# Patient Record
Sex: FEMALE | Race: WHITE | NOT HISPANIC OR LATINO | Employment: FULL TIME | URBAN - METROPOLITAN AREA
[De-identification: names, ages, dates, MRNs, and addresses within clinical notes are randomized per-mention and may not be internally consistent; named-entity substitution may affect disease eponyms.]

---

## 2018-02-06 RX ORDER — METAXALONE 800 MG/1
800 TABLET ORAL 3 TIMES DAILY PRN
COMMUNITY
End: 2018-03-13

## 2018-02-06 RX ORDER — HYDROCODONE BITARTRATE AND ACETAMINOPHEN 10; 325 MG/1; MG/1
1 TABLET ORAL 4 TIMES DAILY PRN
COMMUNITY

## 2018-02-06 RX ORDER — FLUOXETINE HYDROCHLORIDE 20 MG/1
20 CAPSULE ORAL DAILY
COMMUNITY
End: 2018-07-31

## 2018-02-06 RX ORDER — IBUPROFEN 200 MG
200 TABLET ORAL EVERY 6 HOURS PRN
COMMUNITY
End: 2018-03-13

## 2018-02-06 RX ORDER — LISINOPRIL 5 MG/1
5 TABLET ORAL DAILY
COMMUNITY

## 2018-02-06 RX ORDER — ALBUTEROL SULFATE 90 UG/1
2 AEROSOL, METERED RESPIRATORY (INHALATION) EVERY 4 HOURS PRN
COMMUNITY
End: 2018-07-31

## 2018-02-06 RX ORDER — NAPROXEN 500 MG/1
250 TABLET ORAL 2 TIMES DAILY WITH MEALS
COMMUNITY
End: 2018-03-13

## 2018-02-06 RX ORDER — CYCLOBENZAPRINE HCL 10 MG
10 TABLET ORAL 3 TIMES DAILY PRN
COMMUNITY
End: 2018-03-13

## 2018-02-07 ENCOUNTER — DOCUMENTATION (OUTPATIENT)
Dept: NEUROSURGERY | Facility: CLINIC | Age: 53
End: 2018-02-07

## 2018-02-07 ENCOUNTER — OFFICE VISIT (OUTPATIENT)
Dept: NEUROSURGERY | Facility: CLINIC | Age: 53
End: 2018-02-07

## 2018-02-07 VITALS — TEMPERATURE: 97.3 F | HEIGHT: 61 IN | WEIGHT: 160 LBS | BODY MASS INDEX: 30.21 KG/M2

## 2018-02-07 DIAGNOSIS — M51.36 DEGENERATIVE DISC DISEASE, LUMBAR: ICD-10-CM

## 2018-02-07 DIAGNOSIS — M51.16 HERNIATION OF LUMBAR INTERVERTEBRAL DISC WITH RADICULOPATHY: Primary | ICD-10-CM

## 2018-02-07 DIAGNOSIS — M47.816 FACET ARTHRITIS OF LUMBAR REGION: ICD-10-CM

## 2018-02-07 PROCEDURE — 99243 OFF/OP CNSLTJ NEW/EST LOW 30: CPT | Performed by: NEUROLOGICAL SURGERY

## 2018-02-07 RX ORDER — GABAPENTIN 300 MG/1
300 CAPSULE ORAL 3 TIMES DAILY
Qty: 90 CAPSULE | Refills: 1 | OUTPATIENT
Start: 2018-02-07 | End: 2018-04-19 | Stop reason: SDUPTHER

## 2018-02-07 NOTE — PROGRESS NOTES
Patient: Doreen Canela  : 1965    Primary Care Provider: Taras Silver MD    Requesting Provider: As above      History    Chief Complaint: Back and right leg pain.    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  She has a 2-1/2 year history of intermittent low back pain.  5 weeks ago she awoke with a severe stabbing pain in her back that runs down the right leg into the right anterior shin.  She's been to the emergency room on 2 occasions.  She's been treated oral steroids and muscle relaxants.  She is using hydrocodone which modestly helps.  Occasionally she has some tingling in both legs.  She has no left leg pain.  She denies bowel or bladder dysfunction.  She is worse with walking, sitting, lying down.  Soaking in a tub can be helpful.  She's been working with Dr. High her chiropractor.    Review of Systems   Constitutional: Negative for activity change, appetite change, chills, diaphoresis, fatigue, fever and unexpected weight change.   HENT: Negative for congestion, dental problem, drooling, ear discharge, ear pain, facial swelling, hearing loss, mouth sores, nosebleeds, postnasal drip, rhinorrhea, sinus pressure, sneezing, sore throat, tinnitus, trouble swallowing and voice change.    Eyes: Negative for photophobia, pain, discharge, redness, itching and visual disturbance.   Respiratory: Negative for apnea, cough, choking, chest tightness, shortness of breath, wheezing and stridor.    Cardiovascular: Negative for chest pain, palpitations and leg swelling.   Gastrointestinal: Negative for abdominal distention, abdominal pain, anal bleeding, blood in stool, constipation, diarrhea, nausea, rectal pain and vomiting.   Endocrine: Negative for cold intolerance, heat intolerance, polydipsia, polyphagia and polyuria.   Genitourinary: Negative for decreased urine volume, difficulty urinating, dysuria, enuresis, flank pain, frequency, genital  "sores, hematuria and urgency.   Musculoskeletal: Positive for back pain. Negative for arthralgias, gait problem, joint swelling, myalgias, neck pain and neck stiffness.   Skin: Negative for color change, pallor, rash and wound.   Allergic/Immunologic: Negative for environmental allergies, food allergies and immunocompromised state.   Neurological: Negative for dizziness, tremors, seizures, syncope, facial asymmetry, speech difficulty, weakness, light-headedness, numbness and headaches.   Hematological: Negative for adenopathy. Does not bruise/bleed easily.   Psychiatric/Behavioral: Negative for agitation, behavioral problems, confusion, decreased concentration, dysphoric mood, hallucinations, self-injury, sleep disturbance and suicidal ideas. The patient is not nervous/anxious and is not hyperactive.        The patient's past medical history, past surgical history, family history, and social history have been reviewed at length in the electronic medical record.    Physical Exam:   Temp 97.3 °F (36.3 °C) (Temporal Artery )   Ht 154.9 cm (61\")  Wt 72.6 kg (160 lb)  BMI 30.23 kg/m2  CONSTITUTIONAL: Patient is well-nourished, pleasant and appears stated age.  CV: Heart regular rate and rhythm without murmur, rub, or gallop.  PULMONARY: Lungs are clear to ascultation.  MUSCULOSKELETAL:  The patient is miserable and prefers to lie on the examination table and only with difficulties able to sit up.  Straight leg raising is positive on the right at approximate 45°..  Ky's Sign is negative.  ROM in back is noted in all directions.  Tenderness in the back to palpation is not observed.  NEUROLOGICAL:  Orientation, memory, attention span, language function, and cognition have been examined and are intact.  Strength is intact in the lower extremities to direct testing.  Muscle tone is normal throughout.  Station and gait are normal.  Sensation is intact to light touch testing throughout.  Deep tendon reflexes are 1+ and " symmetrical.  Coordination is intact.      Medical Decision Making    Data Review:   MRI lumbar spine dated 2/2/18 demonstrates some degenerative disc disease and some disc bulging.  I believe that there is an extraforaminal disc protrusion on the right at L4-5 that compromises the L4 nerve root.    Diagnosis:   Right L4 radiculopathy secondary to extraforaminal disc protrusion.    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  I prescribed Neurontin.  She'll follow-up in my clinic in about 2 weeks.  If her symptoms persist then we'll need to consider extraforaminal discectomy on the right at L4-5.       Diagnosis Plan   1. Herniation of lumbar intervertebral disc with radiculopathy     2. Degenerative disc disease, lumbar     3. Facet arthritis of lumbar region         Scribed for Bayron Vega MD by Tracey Mann CMA on 02/07/2018 at 10:03 AM      I, Dr. Vega, personally performed the services described in the documentation, as scribed in my presence, and it is both accurate and complete.

## 2018-02-07 NOTE — PROGRESS NOTES
Pt called in regards to her Gabapentin Rx, Pharmacy hadn't received it.    -I went ahead and called in Rx since Tracey is in clinic at the moment.   BELLA Varghese.

## 2018-02-20 ENCOUNTER — OFFICE VISIT (OUTPATIENT)
Dept: NEUROSURGERY | Facility: CLINIC | Age: 53
End: 2018-02-20

## 2018-02-20 VITALS — WEIGHT: 162 LBS | TEMPERATURE: 96.9 F | HEIGHT: 61 IN | BODY MASS INDEX: 30.58 KG/M2

## 2018-02-20 DIAGNOSIS — M51.36 DEGENERATIVE DISC DISEASE, LUMBAR: ICD-10-CM

## 2018-02-20 DIAGNOSIS — M47.816 FACET ARTHRITIS OF LUMBAR REGION: ICD-10-CM

## 2018-02-20 DIAGNOSIS — M51.16 HERNIATION OF LUMBAR INTERVERTEBRAL DISC WITH RADICULOPATHY: Primary | ICD-10-CM

## 2018-02-20 PROCEDURE — 99213 OFFICE O/P EST LOW 20 MIN: CPT | Performed by: NEUROLOGICAL SURGERY

## 2018-02-20 NOTE — PROGRESS NOTES
Patient: Doreen Canela  : 1965    Primary Care Provider: Taras Silver MD    Requesting Provider: As above      History    Chief Complaint: Back and right leg pain.    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  She has a 2-1/2 year history of intermittent low back pain.  8 weeks ago she awoke with a severe stabbing pain in her back that runs down the right leg into the right anterior shin.  She's been to the emergency room on 2 occasions.  She's been treated oral steroids and muscle relaxants.  She is using hydrocodone which modestly helps.  Occasionally she has some tingling in both legs.  She has no left leg pain.  She denies bowel or bladder dysfunction.  She is worse with walking, sitting, lying down.  Soaking in a tub can be helpful.  She's worked with Dr. High her chiropractor.  She has attended physical therapy and overall feels somewhat better.  Traction worsened her symptoms and was abandoned.  She continues to take Neurontin and naproxen which are helpful.    Review of Systems   Constitutional: Negative for activity change, appetite change, chills, diaphoresis, fatigue, fever and unexpected weight change.   HENT: Negative for congestion, dental problem, drooling, ear discharge, ear pain, facial swelling, hearing loss, mouth sores, nosebleeds, postnasal drip, rhinorrhea, sinus pressure, sneezing, sore throat, tinnitus, trouble swallowing and voice change.    Eyes: Negative for photophobia, pain, discharge, redness, itching and visual disturbance.   Respiratory: Negative for apnea, cough, choking, chest tightness, shortness of breath, wheezing and stridor.    Cardiovascular: Negative for chest pain, palpitations and leg swelling.   Gastrointestinal: Negative for abdominal distention, abdominal pain, anal bleeding, blood in stool, constipation, diarrhea, nausea, rectal pain and vomiting.   Endocrine: Negative for cold intolerance,  "heat intolerance, polydipsia, polyphagia and polyuria.   Genitourinary: Negative for decreased urine volume, difficulty urinating, dysuria, enuresis, flank pain, frequency, genital sores, hematuria and urgency.   Musculoskeletal: Positive for back pain. Negative for arthralgias, gait problem, joint swelling, myalgias, neck pain and neck stiffness.   Skin: Negative for color change, pallor, rash and wound.   Allergic/Immunologic: Negative for environmental allergies, food allergies and immunocompromised state.   Neurological: Negative for dizziness, tremors, seizures, syncope, facial asymmetry, speech difficulty, weakness, light-headedness, numbness and headaches.   Hematological: Negative for adenopathy. Does not bruise/bleed easily.   Psychiatric/Behavioral: Negative for agitation, behavioral problems, confusion, decreased concentration, dysphoric mood, hallucinations, self-injury, sleep disturbance and suicidal ideas. The patient is not nervous/anxious and is not hyperactive.        The patient's past medical history, past surgical history, family history, and social history have been reviewed at length in the electronic medical record.    Physical Exam:   Temp 96.9 °F (36.1 °C) (Temporal Artery )   Ht 154.9 cm (61\")  Wt 73.5 kg (162 lb)  BMI 30.61 kg/m2  MUSCULOSKELETAL:  Straight leg raising is negative.  Ky's Sign is negative.  ROM in back is normal.  Tenderness in the back to palpation is not observed.  NEUROLOGICAL:  Strength is intact in the lower extremities to direct testing.  Muscle tone is normal throughout.  Station and gait are normal.  Sensation is intact to light touch testing throughout except in the right anterior shin where it is diminished.      Medical Decision Making    Data Review:   MRI lumbar spine dated 2/2/18 demonstrates some degenerative disc disease and some disc bulging.  I believe that there is an extraforaminal disc protrusion on the right at L4-5 that compromises the L4 " nerve root.    Diagnosis:   Right L4 radiculopathy secondary to extraforaminal disc protrusion.    Treatment Options:   Ms. Canela was made some improvement.  With difficulty she was able to work at 9 hour shift yesterday.  We will continue her therapy and medication.  She'll follow-up in my clinic in 3-4 weeks to check on her progress.  If her symptoms persist then we will need to consider either an epidural injection or surgical intervention.       Diagnosis Plan   1. Herniation of lumbar intervertebral disc with radiculopathy     2. Degenerative disc disease, lumbar     3. Facet arthritis of lumbar region         Scribed for Bayron Vega MD by Tracey Mann CMA on 02/20/2018 at 9:19 AM      I, Dr. Vega, personally performed the services described in the documentation, as scribed in my presence, and it is both accurate and complete.

## 2018-03-13 ENCOUNTER — OFFICE VISIT (OUTPATIENT)
Dept: NEUROSURGERY | Facility: CLINIC | Age: 53
End: 2018-03-13

## 2018-03-13 VITALS — WEIGHT: 158 LBS | HEIGHT: 61 IN | BODY MASS INDEX: 29.83 KG/M2 | TEMPERATURE: 97.1 F

## 2018-03-13 DIAGNOSIS — M51.36 DEGENERATIVE DISC DISEASE, LUMBAR: ICD-10-CM

## 2018-03-13 DIAGNOSIS — M47.816 FACET ARTHRITIS OF LUMBAR REGION: ICD-10-CM

## 2018-03-13 DIAGNOSIS — M51.16 HERNIATION OF LUMBAR INTERVERTEBRAL DISC WITH RADICULOPATHY: Primary | ICD-10-CM

## 2018-03-13 PROCEDURE — 99212 OFFICE O/P EST SF 10 MIN: CPT | Performed by: NEUROLOGICAL SURGERY

## 2018-03-13 NOTE — PROGRESS NOTES
Patient: Doreen Canela  : 1965    Primary Care Provider: Taras Silver MD    Requesting Provider: As above        History    Chief Complaint: Back and right leg pain.    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  She has a 2.5 year history of intermittent low back pain.  About 10-12 weeks ago she awoke with severe pain in her back running into the right anterior shin.  Studies demonstrated an extraforaminal disc protrusion on the right at L4-5 that was felt to be the source for her symptoms.  She has continued on her therapy as well as Neurontin and naproxen.  She has some tingling in her right shin.  She's continued to work.  She's increased her activity.  She has little pain at this point.  She continues to smoke heavily.  She has stopped her naproxen.    Review of Systems   Constitutional: Negative for activity change, appetite change, chills, diaphoresis, fatigue, fever and unexpected weight change.   HENT: Negative for congestion, dental problem, drooling, ear discharge, ear pain, facial swelling, hearing loss, mouth sores, nosebleeds, postnasal drip, rhinorrhea, sinus pressure, sneezing, sore throat, tinnitus, trouble swallowing and voice change.    Eyes: Negative for photophobia, pain, discharge, redness, itching and visual disturbance.   Respiratory: Negative for apnea, cough, choking, chest tightness, shortness of breath, wheezing and stridor.    Cardiovascular: Negative for chest pain, palpitations and leg swelling.   Gastrointestinal: Negative for abdominal distention, abdominal pain, anal bleeding, blood in stool, constipation, diarrhea, nausea, rectal pain and vomiting.   Endocrine: Negative for cold intolerance, heat intolerance, polydipsia, polyphagia and polyuria.   Genitourinary: Negative for decreased urine volume, difficulty urinating, dysuria, enuresis, flank pain, frequency, genital sores, hematuria and urgency.  "  Musculoskeletal: Positive for back pain. Negative for arthralgias, gait problem, joint swelling, myalgias, neck pain and neck stiffness.   Skin: Negative for color change, pallor, rash and wound.   Allergic/Immunologic: Negative for environmental allergies, food allergies and immunocompromised state.   Neurological: Negative for dizziness, tremors, seizures, syncope, facial asymmetry, speech difficulty, weakness, light-headedness, numbness and headaches.   Hematological: Negative for adenopathy. Does not bruise/bleed easily.   Psychiatric/Behavioral: Negative for agitation, behavioral problems, confusion, decreased concentration, dysphoric mood, hallucinations, self-injury, sleep disturbance and suicidal ideas. The patient is not nervous/anxious and is not hyperactive.        The patient's past medical history, past surgical history, family history, and social history have been reviewed at length in the electronic medical record.    Physical Exam:   Ht 154.9 cm (61\")   MUSCULOSKELETAL:  Straight leg raising is negative.  Ky's Sign is negative.  ROM in back is normal.  Tenderness in the back to palpation is not observed.  NEUROLOGICAL:  Strength is intact in the lower extremities to direct testing.  Muscle tone is normal throughout.  Station and gait are normal.  Sensation is intact to light touch testing throughout except in the right anterior shin where it is diminished.      Medical Decision Making    Diagnosis:   Right L4 radiculopathy due to extraforaminal disc herniation, improved.    Treatment Options:   Ms. Canela is doing well.  She will slowly wean down on her Neurontin.  If her symptoms recur in a substantial fashion then we will be happy to see her in follow-up.  I have discussed the merits of smoking cessation as it relates to her back difficulties.       Diagnosis Plan   1. Herniation of lumbar intervertebral disc with radiculopathy     2. Degenerative disc disease, lumbar     3. Facet arthritis " of lumbar region       Scribed for Bayron Vega MD by Jimena Hdz CMA on 03/13/2018 at 9:36 AM    I, Dr. Vega, personally performed the services described in the documentation, as scribed in my presence, and it is both accurate and complete.

## 2018-03-29 ENCOUNTER — TELEPHONE (OUTPATIENT)
Dept: NEUROSURGERY | Facility: CLINIC | Age: 53
End: 2018-03-29

## 2018-03-29 NOTE — TELEPHONE ENCOUNTER
I called the pt back and obtained the following:    Per Dr. Vega, Ms. Canela was tapering off her gabapentin. She had 10 days of bid then switched down to one a day on Friday 3/23 and since  Tuesday 3/27 has noticed a return of the nerve pain in her right hip, only. She is still going to PT and is doing well otherwise. She would like to know if she can return  taking the gabapentin BID a little longer until things settle down. She has enough left on her current script to last her 24 days if taking BID.     Please advise.

## 2018-03-29 NOTE — TELEPHONE ENCOUNTER
Provider:  Gary  Caller: ranjeet  Time of call:  11:46   Phone #:  [phone]  Surgery:  NA  Surgery Date:  NA  Last visit:   3/13/2018  Next visit: AIDEN RENEE:         Reason for call:         Pt called wanting to speak with Tracey.  She states that she is still having the pain in her right hip and numbness.  Pt said she only has 3 days left of taking 1 pill a day and wants to know what to do from here on out.

## 2018-04-04 ENCOUNTER — TELEPHONE (OUTPATIENT)
Dept: NEUROSURGERY | Facility: CLINIC | Age: 53
End: 2018-04-04

## 2018-04-04 NOTE — TELEPHONE ENCOUNTER
Provider:  Gary  Caller:  ranjeet  Time of call:   12:32pm  Phone #:  [phone]  Surgery:  Na  Surgery Date:    Last visit:   03/13/18  Next visit: NA      Reason for call:         Pt left msg: FYI- Gabapentin increased to 1 BID has helped allowing her to tolerate her pain a little more.  Nothing more stated.  Pt does not expect a return call.

## 2018-04-19 RX ORDER — GABAPENTIN 300 MG/1
300 CAPSULE ORAL 2 TIMES DAILY
Qty: 60 CAPSULE | Refills: 1 | OUTPATIENT
Start: 2018-04-19 | End: 2018-06-27 | Stop reason: SDUPTHER

## 2018-04-19 NOTE — TELEPHONE ENCOUNTER
Provider:  Gary  Caller: Doreen Canela  Time of call:   10:01 AM  Phone #:  [phone]  Last visit:   03/13/18  Next visit: none scheduled    VÍCTOR:       02/07/2018 Gabapentin 300MG 1965 90 30 Bayron Vega  03/13/2018 Gabapentin 300MG 1965 90 30 Bayron Vega    Reason for call:       Pt requesting refill on Gabapentin, per last telephone encounter, pt is now taking it BID

## 2018-04-19 NOTE — TELEPHONE ENCOUNTER
Called in Gabapentin 300 mg BID #60 1RF to patient's pharmacy.     Called pt to let her know, she is aware. Pt wanted to make sure it was ok to use an inversion table, said that she has been using it and it has been helping her pain. She said that she has been using it for about 10 min about twice a day. Adv pt that was totally fine, adv that if she continues to feel better she can try weaning off the gabapentin again and see how that goes, she said she would give that a try. No further questions

## 2018-06-21 ENCOUNTER — TELEPHONE (OUTPATIENT)
Dept: NEUROSURGERY | Facility: CLINIC | Age: 53
End: 2018-06-21

## 2018-06-21 NOTE — TELEPHONE ENCOUNTER
Pt called in today to get an appointment.  She said that when she received her last refill on her Gabapentin that she was told to decrease her dosage but only was able to do it for 5 days because the pain was so bad.    A week ago she had a fall and since then she has been having constant numbness and tingling in her lower extremities.  Who should she see in the office and does there need to be testing prior to?

## 2018-06-27 ENCOUNTER — OFFICE VISIT (OUTPATIENT)
Dept: NEUROSURGERY | Facility: CLINIC | Age: 53
End: 2018-06-27

## 2018-06-27 VITALS
BODY MASS INDEX: 30.21 KG/M2 | HEIGHT: 61 IN | DIASTOLIC BLOOD PRESSURE: 76 MMHG | TEMPERATURE: 97.1 F | WEIGHT: 160 LBS | SYSTOLIC BLOOD PRESSURE: 140 MMHG

## 2018-06-27 DIAGNOSIS — Z71.6 ENCOUNTER FOR SMOKING CESSATION COUNSELING: ICD-10-CM

## 2018-06-27 DIAGNOSIS — M51.16 HERNIATION OF LUMBAR INTERVERTEBRAL DISC WITH RADICULOPATHY: Primary | ICD-10-CM

## 2018-06-27 DIAGNOSIS — M47.816 FACET ARTHRITIS OF LUMBAR REGION: ICD-10-CM

## 2018-06-27 DIAGNOSIS — M51.36 DEGENERATIVE DISC DISEASE, LUMBAR: ICD-10-CM

## 2018-06-27 PROCEDURE — 99406 BEHAV CHNG SMOKING 3-10 MIN: CPT | Performed by: PHYSICIAN ASSISTANT

## 2018-06-27 PROCEDURE — 99214 OFFICE O/P EST MOD 30 MIN: CPT | Performed by: PHYSICIAN ASSISTANT

## 2018-06-27 RX ORDER — GABAPENTIN 300 MG/1
300 CAPSULE ORAL 3 TIMES DAILY
Qty: 90 CAPSULE | Refills: 1 | OUTPATIENT
Start: 2018-06-27

## 2018-06-27 NOTE — PROGRESS NOTES
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  More recently she complained of severe pain radiating down the right leg to the anterior shin.  Her studies demonstrated an extraforaminal disc protrusion on the right at L4 5, felt to be the source for her symptoms.  She was treated with physical therapy and Neurontin earlier this year and her symptoms settled down substantially.  She backed down on her Neurontin to twice a day dosing and was doing well at that dose.  Then 3 weeks ago she slipped in her yard and landed on a rock.  Since that time her low back and right leg pain has been worse.  Neurontin is helping but usually wears off by the middle of the day prior to her taking her second dose at bedtime.  She denies left-sided symptoms.  No bowel or bladder difficulties.  She is unable to take NSAIDs due to some kidney issues.  Every now and then she will take half of the pain pill to get her through the day.          Past Medical History:   Diagnosis Date   • Anxiety    • GERD (gastroesophageal reflux disease)        Past Surgical History:   Procedure Laterality Date   • TONSILLECTOMY     • TOTAL ABDOMINAL HYSTERECTOMY WITH SALPINGO OOPHORECTOMY         Social History     Social History   • Marital status:      Spouse name: N/A   • Number of children: N/A   • Years of education: N/A     Occupational History   • Not on file.     Social History Main Topics   • Smoking status: Current Every Day Smoker     Packs/day: 1.00     Types: Cigarettes   • Smokeless tobacco: Not on file   • Alcohol use Not on file   • Drug use: Unknown   • Sexual activity: Not on file     Other Topics Concern   • Not on file     Social History Narrative   • No narrative on file         Current  "Outpatient Prescriptions:   •  albuterol (PROVENTIL HFA;VENTOLIN HFA) 108 (90 Base) MCG/ACT inhaler, Inhale 2 puffs Every 4 (Four) Hours As Needed for Wheezing., Disp: , Rfl:   •  FLUoxetine (PROzac) 20 MG capsule, Take 20 mg by mouth Daily., Disp: , Rfl:   •  gabapentin (NEURONTIN) 300 MG capsule, Take 1 capsule by mouth 2 (Two) Times a Day., Disp: 60 capsule, Rfl: 1  •  HYDROcodone-acetaminophen (NORCO)  MG per tablet, Take 1 tablet by mouth 4 (Four) Times a Day As Needed for Moderate Pain ., Disp: , Rfl:   •  lisinopril (PRINIVIL,ZESTRIL) 5 MG tablet, Take 5 mg by mouth Daily., Disp: , Rfl:     I discussed >3m the need to STOP smoking, there is a direct correlation between smoking and wound healing and degenerative disc disease. Patient will take this under advisement and discuss with their primary provider.      Review of Systems   Musculoskeletal: Positive for back pain.   All other systems reviewed and are negative.      Objective   Vital Signs: Blood pressure 140/76, temperature 97.1 °F (36.2 °C), temperature source Temporal Artery , height 154.9 cm (61\"), weight 72.6 kg (160 lb).  Physical Exam   Constitutional: She appears well-developed and well-nourished. No distress.   Patient appears comfortable today   HENT:   Head: Normocephalic and atraumatic.   Eyes: EOM are normal. Pupils are equal, round, and reactive to light.   Cardiovascular: Normal rate and regular rhythm.    Pulmonary/Chest: Effort normal and breath sounds normal.   Psychiatric: She has a normal mood and affect. Her behavior is normal. Thought content normal.   Nursing note and vitals reviewed.  Musculoskeletal:   Strength is intact in upper and lower extremities to direct testing.  Station and gait are normal.  Straight leg raising is negative.   Neurologic:  Muscle tone is normal throughout.  Coordination is intact.  Deep tendon reflexes: 1+ and symmetrical.  Sensation is intact to light touch throughout.  Patient is oriented to " person, place, and time.    Radiographic imaging: Review of previous MRI from 2/2/2018 demonstrates extraforaminal disc protrusion on the right L4-5.    Assessment/Plan    Diagnosis:   Right L4 radiculopathy due to extraforaminal disc herniation    Medical Decision Making: Ms. Canela was doing well until she fell a few weeks ago.  Neurontin helps but does not last all day. I recommended going back to TID dosing for a few weeks. I will see her back in one month to check her progress. If we are not able to control her symptoms with this we may need to get a new MRI for surgical consideration. She continues to smoke heavily and was counseled on the importance of quitting as it relates to her back issues.            Doreen was seen today for back pain.    Diagnoses and all orders for this visit:    Herniation of lumbar intervertebral disc with radiculopathy    Degenerative disc disease, lumbar    Facet arthritis of lumbar region               Yasmin Julian PA-C  Patient Care Team:  Taras Silver MD as PCP - General (Internal Medicine)  Taras Silver MD as Referring Physician (Internal Medicine)  Rafi High DC (Chiropractic Medicine)

## 2018-07-31 ENCOUNTER — OFFICE VISIT (OUTPATIENT)
Dept: NEUROSURGERY | Facility: CLINIC | Age: 53
End: 2018-07-31

## 2018-07-31 VITALS
TEMPERATURE: 96.9 F | SYSTOLIC BLOOD PRESSURE: 140 MMHG | DIASTOLIC BLOOD PRESSURE: 84 MMHG | HEART RATE: 76 BPM | BODY MASS INDEX: 29.83 KG/M2 | HEIGHT: 61 IN | WEIGHT: 158 LBS

## 2018-07-31 DIAGNOSIS — Z71.6 ENCOUNTER FOR SMOKING CESSATION COUNSELING: ICD-10-CM

## 2018-07-31 DIAGNOSIS — M51.36 DEGENERATIVE DISC DISEASE, LUMBAR: ICD-10-CM

## 2018-07-31 DIAGNOSIS — M47.816 FACET ARTHRITIS OF LUMBAR REGION: ICD-10-CM

## 2018-07-31 DIAGNOSIS — M51.16 HERNIATION OF LUMBAR INTERVERTEBRAL DISC WITH RADICULOPATHY: Primary | ICD-10-CM

## 2018-07-31 PROCEDURE — 99213 OFFICE O/P EST LOW 20 MIN: CPT | Performed by: PHYSICIAN ASSISTANT

## 2018-07-31 NOTE — PROGRESS NOTES
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  More recently she complained of severe pain radiating down the right leg to the anterior shin.  Her studies demonstrated an extraforaminal disc protrusion on the right at L4 5, felt to be the source for her symptoms.  She was treated with physical therapy and Neurontin earlier this year.  Her symptoms settled down until she had a fall a couple months ago and the pain returned. Her Neurontin was restarted and increased to 300mg TID.  Today she says as long as she takes that medication consistently, she does well.  She has days when her leg is more bothersome but for the most part she is getting by. She is apprehensive about surgery.  Also she does not want to take off work.     I reviewed the following portions of the patient's history and updated as appropriate: allergies, current medications, past family history, past medical history, past social history, past surgical history and problem list.    I discussed >3m the need to STOP smoking, there is a direct correlation between smoking and wound healing and degenerative disc disease. Patient will take this under advisement and discuss with their primary provider.      Review of Systems   Musculoskeletal: Positive for back pain, joint swelling and neck stiffness.   All other systems reviewed and are negative.      Objective   Vital Signs: Blood pressure 140/84, pulse 76, temperature 96.9 °F (36.1 °C), height 154.9 cm (61\"), weight 71.7 kg (158 lb).  Physical Exam  Constitutional: She appears well-developed and well-nourished. No distress.   Patient appears comfortable today   HENT:   Head: Normocephalic and atraumatic.   Eyes: EOM are normal. Pupils are equal, round, and reactive to " light.   Cardiovascular: Normal rate and regular rhythm.    Pulmonary/Chest: Effort normal and breath sounds normal.   Psychiatric: She has a normal mood and affect. Her behavior is normal. Thought content normal.   Nursing note and vitals reviewed.  Musculoskeletal:   Strength is intact in upper and lower extremities to direct testing.  Station and gait are normal.  Straight leg raising is negative.   Neurologic:  Muscle tone is normal throughout.  Coordination is intact.  Deep tendon reflexes: 1+ and symmetrical.  Sensation is intact to light touch throughout.  Patient is oriented to person, place, and time.     Radiographic imaging: Review of previous MRI from 2/2/2018 demonstrates extraforaminal disc protrusion on the right L4-5.    Assessment/Plan    Diagnosis:   Right L4 radiculopathy due to extraforaminal disc herniation     Medical Decision Making: Ms. Canela is getting by.  She is pleased to be able to continue working.  We will continue Neurontin 300mg TID for now.  She may follow up with us in a couple months for reevaluation. We may be able to back down on this medication at that time.  She will call in the interim if she develops new or worsening symptoms.           Doreen was seen today for back pain.    Diagnoses and all orders for this visit:    Herniation of lumbar intervertebral disc with radiculopathy    Degenerative disc disease, lumbar    Facet arthritis of lumbar region (CMS/[facility])    Encounter for smoking cessation counseling               Yasmin Julian PA-C  Patient Care Team:  Taras Silver MD as PCP - General (Internal Medicine)  Taras Silver MD as Referring Physician (Internal Medicine)  Rafi High DC (Chiropractic Medicine)

## 2018-08-30 ENCOUNTER — TELEPHONE (OUTPATIENT)
Dept: NEUROSURGERY | Facility: CLINIC | Age: 53
End: 2018-08-30

## 2018-10-01 ENCOUNTER — TELEPHONE (OUTPATIENT)
Dept: NEUROSURGERY | Facility: CLINIC | Age: 53
End: 2018-10-01

## 2018-10-01 NOTE — TELEPHONE ENCOUNTER
Provider:  Gary  Caller: patient  Time of call:  9:41   Phone #:  [phone]  Surgery:  no  Surgery Date:    Last visit: 07/31/18    Next visit: none    VÍCTOR:     Documentation only    Reason for call: Patient said that she weaned herself off of Gabapentin back in August of this year.  Since then her pain has increased, so she started Gabapentin again.  She only takes 1- 300 mg pill at bedtime.  She states that she has plenty of medication, however she just wanted to let Dr. Vega know.

## 2020-02-09 NOTE — TELEPHONE ENCOUNTER
Provider: Gary   Caller: ranjeet  Time of call: 10:20    Phone #: [phone]   Last visit:   7/31/18  Next visit: not scheduled      Reason for call: Pt called to report that she has weaned off of her medicine as of 8/13 and is doing well.                 no

## 2024-01-31 NOTE — TELEPHONE ENCOUNTER
Pt is aware and I asked her to call us around Wednesday next week to update us. Encounter closed.    Is This A New Presentation, Or A Follow-Up?: Skin Lesions What Type Of Note Output Would You Prefer (Optional)?: Bullet Format How Severe Is Your Skin Lesion?: moderate Has Your Skin Lesion Been Treated?: not been treated